# Patient Record
Sex: FEMALE | Race: WHITE | Employment: FULL TIME | URBAN - METROPOLITAN AREA
[De-identification: names, ages, dates, MRNs, and addresses within clinical notes are randomized per-mention and may not be internally consistent; named-entity substitution may affect disease eponyms.]

---

## 2017-04-10 ENCOUNTER — OFFICE VISIT (OUTPATIENT)
Dept: URGENT CARE | Facility: RETAIL CLINIC | Age: 57
End: 2017-04-10
Payer: COMMERCIAL

## 2017-04-10 VITALS
DIASTOLIC BLOOD PRESSURE: 83 MMHG | TEMPERATURE: 100.4 F | SYSTOLIC BLOOD PRESSURE: 143 MMHG | OXYGEN SATURATION: 97 % | HEART RATE: 88 BPM

## 2017-04-10 DIAGNOSIS — R03.0 ELEVATED BLOOD PRESSURE READING WITHOUT DIAGNOSIS OF HYPERTENSION: ICD-10-CM

## 2017-04-10 DIAGNOSIS — J02.9 ACUTE PHARYNGITIS, UNSPECIFIED ETIOLOGY: ICD-10-CM

## 2017-04-10 DIAGNOSIS — J02.0 ACUTE STREPTOCOCCAL PHARYNGITIS: Primary | ICD-10-CM

## 2017-04-10 LAB — S PYO AG THROAT QL IA.RAPID: ABNORMAL

## 2017-04-10 PROCEDURE — 87880 STREP A ASSAY W/OPTIC: CPT | Mod: QW | Performed by: PHYSICIAN ASSISTANT

## 2017-04-10 PROCEDURE — 99213 OFFICE O/P EST LOW 20 MIN: CPT | Performed by: PHYSICIAN ASSISTANT

## 2017-04-10 RX ORDER — CEPHALEXIN 500 MG/1
500 CAPSULE ORAL 2 TIMES DAILY
Qty: 20 CAPSULE | Refills: 0 | Status: SHIPPED | OUTPATIENT
Start: 2017-04-10 | End: 2018-08-17

## 2017-04-10 NOTE — NURSING NOTE
"Chief Complaint   Patient presents with     Pharyngitis     x 2 days       Initial Pulse 88  Temp 100.4  F (38  C) (Tympanic)  SpO2 97% Estimated body mass index is 37.66 kg/(m^2) as calculated from the following:    Height as of 4/8/16: 5' 9.4\" (1.763 m).    Weight as of 6/13/16: 258 lb (117 kg).  Medication Reconciliation: complete   Paz Yung      "

## 2017-04-10 NOTE — MR AVS SNAPSHOT
After Visit Summary   4/10/2017    Melanie Galeana    MRN: [id number]           Patient Information     Date Of Birth          1960        Visit Information        Provider Department      4/10/2017 10:50 AM Nicol Alvarez PA-C [facility]        Today's Diagnoses     Acute pharyngitis, unspecified etiology    -  1    Acute streptococcal pharyngitis        Elevated blood pressure reading without diagnosis of hypertension          Care Instructions      Pharyngitis: Strep (Confirmed)     You have had a positive test for strep throat. Strep throat is a contagious illness. It is spread by coughing, kissing or by touching others after touching your mouth or nose. Symptoms include throat pain which is worse with swallowing, aching all over, headache and fever. It is treated with antibiotic medication. This should help you start to feel better within 1-2 days.  Home care    Rest at home. Drink plenty of fluids to avoid dehydration.    No work or school for the first 2 days of taking the antibiotics. After this time, you will not be contagious. You can then return to school or work if you are feeling better.     The antibiotic medication must be taken for the full 10 days, even if you feel better. This is very important to ensure the infection is treated. It is also important to prevent drug-resistent organisms from developing. If you were given an antibiotic shot, no more antibiotics are needed.    You may use acetaminophen (Tylenol) or ibuprofen (Motrin, Advil) to control pain or fever, unless another medicine was prescribed for this. (NOTE: If you have chronic liver or kidney disease or ever had a stomach ulcer or GI bleeding, talk with your doctor before using these medicines.)    Throat lozenges or sprays (such as Chloraseptic) help reduce pain. Gargling with warm salt water will also reduce throat pain. Dissolve 1/2 teaspoon of salt in 1 glass of warm water. This may be  useful just before meals.     Soft foods are okay. Avoid salty or spicy foods.  Follow-up care  Follow up with your healthcare provider or our staff if you are not improving over the next week.  When to seek medical advice  Call your healthcare provider right away if any of these occur:    Fever as directed by your doctor     New or worsening ear pain, sinus pain, or headache    Painful lumps in the back of neck    Stiff neck    Lymph nodes are getting larger or becoming soft in the middle    Inability to swallow liquids, excessive drooling, or inability to open mouth wide due to throat pain    Signs of dehydration (very dark urine or no urine, sunken eyes, dizziness)    Trouble breathing or noisy breathing    Muffled voice    New rash    4087-5407 [employer]. [address] [place] [address], [place] [address]. All rights reserved. This information is not intended as a substitute for professional medical care. Always follow your healthcare professional's instructions.    ................................    Your blood pressure is elevated at today's visit.  You should follow up with your primary provider regarding possible hypertension if your recheck are greater than 140/90.  Check your blood pressure several times in the next week or so. You can do this at local pharmacies, grocery stores or with the float nurse at the [facility]. Record your readings and take them with you to your appointment.  Goal BP <140/90  Do not take decongestants - they can raise your BP.  If you have chest pain, unusual headaches, vision changes or any sign or symptoms of stroke seek prompt medical attention.  /83  Pulse 88  Temp 100.4  F (38  C) (Tympanic)  SpO2 97%    ................................      Please FOLLOW UP at primary care clinic if not improving, new symptoms, worse or this does not resolve.  [facility]  [phone]          Follow-ups after your visit        Who to contact     You can  reach your care team any time of the day by calling [phone].  Notification of test results:  If you have an abnormal lab result, we will notify you by phone as soon as possible.         Additional Information About Your Visit        [employer] Information     [employer] gives you secure access to your electronic health record. If you see a primary care provider, you can also send messages to your care team and make appointments. If you have questions, please call your primary care clinic.  If you do not have a primary care provider, please call [phone] and they will assist you.        Care EveryWhere ID     This is your Care EveryWhere ID. This could be used by other organizations to access your [facility] medical records  [id number]        Your Vitals Were     Pulse Temperature Pulse Oximetry             88 100.4  F (38  C) (Tympanic) 97%          Blood Pressure from Last 3 Encounters:   04/10/17 143/83   07/08/16 (!) 150/108   06/13/16 128/80    Weight from Last 3 Encounters:   06/13/16 258 lb (117 kg)   04/08/16 250 lb (113.4 kg)   03/18/16 249 lb (112.9 kg)              We Performed the Following     RAPID STREP SCREEN          Today's Medication Changes          These changes are accurate as of: 4/10/17 11:20 AM.  If you have any questions, ask your nurse or doctor.               Start taking these medicines.        Dose/Directions    cephALEXin 500 MG capsule   Commonly known as:  KEFLEX   Used for:  Acute streptococcal pharyngitis   Started by:  Nicol Alvarez PA-C        Dose:  500 mg   Take 1 capsule (500 mg) by mouth 2 times daily   Quantity:  20 capsule   Refills:  0            Where to get your medicines      These medications were sent to [facility] [address] [place] [address] - [address]  [address], [facility] [address]     Phone:  [phone]     cephALEXin 500 MG capsule                Primary Care Provider Office Phone # Fax #    Nasreen Campo PA-C [phone]        [address] [place] [address]        Thank you!     Thank you for choosing [facility]  for your care. Our goal is always to provide you with excellent care. Hearing back from our patients is one way we can continue to improve our services. Please take a few minutes to complete the written survey that you may receive in the mail after your visit with us. Thank you!             Your Updated Medication List - Protect others around you: Learn how to safely use, store and throw away your medicines at www.disposemymeds.org.          This list is accurate as of: 4/10/17 11:20 AM.  Always use your most recent med list.                   Brand Name Dispense Instructions for use    ASPIRIN PO      Take 650 mg by mouth daily as needed for moderate pain       cephALEXin 500 MG capsule    KEFLEX    20 capsule    Take 1 capsule (500 mg) by mouth 2 times daily       IBUPROFEN PO          multivitamin, therapeutic with minerals Tabs tablet      Take 1 tablet by mouth daily       PRO-BIOTIC BLEND Caps      Take by mouth daily       TYLENOL PO          VITAMIN D3 PO      Take by mouth daily

## 2017-04-10 NOTE — PROGRESS NOTES
Chief Complaint   Patient presents with     Pharyngitis     x 2 days         SUBJECTIVE:   Pt. presenting to [facility] -  with a chief complaint of ST an fever -started yest. Some nasal congestion.  Onset of symptoms sudden  Course of illness is same.    Severity moderate  Current and Associated symptoms: fever and sore throat  Treatment measures tried include Fluids, OTC meds and Rest.  Predisposing factors include works as a  -worked yest.  Last antibiotic > year    Past Medical History:   Diagnosis Date     Menopause 2013    age 52     Unspecified essential hypertension 8/2005     Past Surgical History:   Procedure Laterality Date     ARTHROSCOPY KNEE WITH MEDIAL MENISCECTOMY Right 3/9/2016    Procedure: ARTHROSCOPY KNEE WITH MEDIAL MENISCECTOMY;  Surgeon: Todd Larson MD;  Location: [facility] OR     COLONOSCOPY  09/08/08     COLONOSCOPY N/A 7/8/2016    Procedure: COLONOSCOPY;  Surgeon: Dewayne Moser MD;  Location: [facility] GI     HC COLONOSCOPY W BIOPSY  07/11/05     Patient Active Problem List   Diagnosis     Lipoma of other skin and subcutaneous tissue     Tear of medial meniscus of knee, right, initial encounter     CARDIOVASCULAR SCREENING; LDL GOAL LESS THAN 160     Family history of colon cancer     History of colonic polyps     Morbid obesity due to excess calories (H)     Menopause     Current Outpatient Prescriptions   Medication     Acetaminophen (TYLENOL PO)     IBUPROFEN PO     Probiotic Product (PRO-BIOTIC BLEND) CAPS     ASPIRIN PO     multivitamin, therapeutic with minerals (THERA-VIT-M) TABS     Cholecalciferol (VITAMIN D3 PO)     No current facility-administered medications for this visit.        OBJECTIVE:  /83  Pulse 88  Temp 100.4  F (38  C) (Tympanic)  SpO2 97%    GENERAL APPEARANCE: cooperative, alert and no distress. Appears well hydrated.  EYES: conjunctiva clear  HENT: Rt ear canal  clear and TM normal   Lt ear canal clear and TM normal   Nose some  "congestion. no discharge  Mouth without ulcers or lesions. marked erythema. no exudate.  NECK: supple, small to mod size soft shoddy NT ant nodes. No  posterior nodes.  RESP: lungs clear to auscultation - no rales, rhonchi or wheezes. Breathing easily.  CV: regular rates and rhythm  ABDOMEN:  soft, nontender, no HSM or masses and bowel sounds normal   SKIN: no suspicious lesions or rashes  no tenderness to palpate over  sinus areas.    Rapid strep pos    ASSESSMENT:     Acute pharyngitis, unspecified etiology  Acute streptococcal pharyngitis  Elevated blood pressure reading without diagnosis of hypertension      PLAN:  Symptomatic measures   Prescriptions as below. Discussed indications, dosing, side affects and adverse reactions of medications with  Patient -Ceph - (discussed cross sens with Live - she tolerated IV cefazolin 3/2016 so will use Ceph. States \"Zpack never works for me\" -discussed )  Eat yogurt daily or take a probiotic supplement when on antibiotics.  OTC cough suppressant/expectorant   Salt water gargles  - throat lozenges or honey/lemon tea if soothing   saline nasal spray for  nasal congestion   Cool mist vaporizer.   Stay in clean air environment.  > rest.  > fluids.  Contagiousness and hygiene discussed.  Fever and pain  control measures discussed.   If unable to swallow or any breathing difficulty to go to ED   Declined note for work -does not work until 4/12/2017.  Discussed BP  AVS given and discussed:  Patient Instructions     Pharyngitis: Strep (Confirmed)     You have had a positive test for strep throat. Strep throat is a contagious illness. It is spread by coughing, kissing or by touching others after touching your mouth or nose. Symptoms include throat pain which is worse with swallowing, aching all over, headache and fever. It is treated with antibiotic medication. This should help you start to feel better within 1-2 days.  Home care    Rest at home. Drink plenty of fluids to avoid " dehydration.    No work or school for the first 2 days of taking the antibiotics. After this time, you will not be contagious. You can then return to school or work if you are feeling better.     The antibiotic medication must be taken for the full 10 days, even if you feel better. This is very important to ensure the infection is treated. It is also important to prevent drug-resistent organisms from developing. If you were given an antibiotic shot, no more antibiotics are needed.    You may use acetaminophen (Tylenol) or ibuprofen (Motrin, Advil) to control pain or fever, unless another medicine was prescribed for this. (NOTE: If you have chronic liver or kidney disease or ever had a stomach ulcer or GI bleeding, talk with your doctor before using these medicines.)    Throat lozenges or sprays (such as Chloraseptic) help reduce pain. Gargling with warm salt water will also reduce throat pain. Dissolve 1/2 teaspoon of salt in 1 glass of warm water. This may be useful just before meals.     Soft foods are okay. Avoid salty or spicy foods.  Follow-up care  Follow up with your healthcare provider or our staff if you are not improving over the next week.  When to seek medical advice  Call your healthcare provider right away if any of these occur:    Fever as directed by your doctor     New or worsening ear pain, sinus pain, or headache    Painful lumps in the back of neck    Stiff neck    Lymph nodes are getting larger or becoming soft in the middle    Inability to swallow liquids, excessive drooling, or inability to open mouth wide due to throat pain    Signs of dehydration (very dark urine or no urine, sunken eyes, dizziness)    Trouble breathing or noisy breathing    Muffled voice    New rash    5265-0665 [employer]. [address] [place] [address]. All rights reserved. This information is not intended as a substitute for professional medical care. Always follow your healthcare professional's  instructions.    ................................    Your blood pressure is elevated at today's visit.  You should follow up with your primary provider regarding possible hypertension if your recheck are greater than 140/90.  Check your blood pressure several times in the next week or so. You can do this at local pharmacies, grocery stores or with the float nurse at the [facility]. Record your readings and take them with you to your appointment.  Goal BP <140/90  Do not take decongestants - they can raise your BP.  If you have chest pain, unusual headaches, vision changes or any sign or symptoms of stroke seek prompt medical attention.  /83  Pulse 88  Temp 100.4  F (38  C) (Tympanic)  SpO2 97%    ................................      Please FOLLOW UP at primary care clinic if not improving, new symptoms, worse or this does not resolve.  [facility]  [phone]      Pt is comfortable with this plan.  Electronically signed,  SHAI Alvarez, PAC

## 2017-08-23 ENCOUNTER — OFFICE VISIT (OUTPATIENT)
Dept: URGENT CARE | Facility: RETAIL CLINIC | Age: 57
End: 2017-08-23
Payer: COMMERCIAL

## 2017-08-23 VITALS
HEART RATE: 83 BPM | TEMPERATURE: 98.8 F | SYSTOLIC BLOOD PRESSURE: 176 MMHG | OXYGEN SATURATION: 98 % | DIASTOLIC BLOOD PRESSURE: 94 MMHG

## 2017-08-23 DIAGNOSIS — R05.9 COUGH: ICD-10-CM

## 2017-08-23 DIAGNOSIS — J20.9 ACUTE BRONCHITIS WITH COEXISTING CONDITION REQUIRING PROPHYLACTIC TREATMENT: Primary | ICD-10-CM

## 2017-08-23 DIAGNOSIS — R50.9 FEVER, UNSPECIFIED: ICD-10-CM

## 2017-08-23 DIAGNOSIS — R03.0 ELEVATED BLOOD PRESSURE READING WITHOUT DIAGNOSIS OF HYPERTENSION: ICD-10-CM

## 2017-08-23 PROCEDURE — 99213 OFFICE O/P EST LOW 20 MIN: CPT | Performed by: PHYSICIAN ASSISTANT

## 2017-08-23 RX ORDER — DOXYCYCLINE HYCLATE 100 MG
100 TABLET ORAL 2 TIMES DAILY
Qty: 20 TABLET | Refills: 0 | Status: SHIPPED | OUTPATIENT
Start: 2017-08-23 | End: 2017-09-02

## 2017-08-23 RX ORDER — ALBUTEROL SULFATE 90 UG/1
2 AEROSOL, METERED RESPIRATORY (INHALATION) 4 TIMES DAILY
Qty: 1 INHALER | Refills: 0 | Status: SHIPPED | OUTPATIENT
Start: 2017-08-23

## 2017-08-23 NOTE — PROGRESS NOTES
Chief Complaint   Patient presents with     Cough     cough since saturday, productive cough since monday along with fever, started wheezing last night         SUBJECTIVE:   Pt. presenting to [facility] -  with a chief complaint of cough x 5 days -getting worse, productive now. Low grade fever. Tired.. No SOB or chest pain   Hx of asthma no.  Onset of symptoms gradual  Course of illness is worsening.    Severity moderate  Current and Associated symptoms: fever and cough   Treatment measures tried include Fluids, OTC meds and Rest.  Predisposing factors include None.  Last antibiotic 4/2017 [facility] for strep     Pregnancy no  Smoker no    Past Medical History:   Diagnosis Date     Menopause 2013    age 52     Unspecified essential hypertension 8/2005     Past Surgical History:   Procedure Laterality Date     ARTHROSCOPY KNEE WITH MEDIAL MENISCECTOMY Right 3/9/2016    Procedure: ARTHROSCOPY KNEE WITH MEDIAL MENISCECTOMY;  Surgeon: Todd Larson MD;  Location: [facility] OR     COLONOSCOPY  09/08/08     COLONOSCOPY N/A 7/8/2016    Procedure: COLONOSCOPY;  Surgeon: Dewayne Moser MD;  Location:  GI     HC COLONOSCOPY W BIOPSY  07/11/05     Patient Active Problem List   Diagnosis     Lipoma of other skin and subcutaneous tissue     Tear of medial meniscus of knee, right, initial encounter     CARDIOVASCULAR SCREENING; LDL GOAL LESS THAN 160     Family history of colon cancer     History of colonic polyps     Morbid obesity due to excess calories (H)     Menopause     Current Outpatient Prescriptions   Medication     Acetaminophen (TYLENOL PO)     IBUPROFEN PO     Probiotic Product (PRO-BIOTIC BLEND) CAPS     ASPIRIN PO     multivitamin, therapeutic with minerals (THERA-VIT-M) TABS     Cholecalciferol (VITAMIN D3 PO)     cephALEXin (KEFLEX) 500 MG capsule     No current facility-administered medications for this visit.        ROS:  ENT - denies ear pain, throat pain. No nasal congestion  CP - see  above  GI- - appetite good. No nausea, vomiting or diarrhea.   No bowel or bladder changes   MSK - no joint pain or swelling   Skin: No rashes    OBJECTIVE:  BP (!) 176/94 (BP Location: Right arm, Patient Position: Chair, Cuff Size: Adult Regular)  Pulse 83  Temp 98.8  F (37.1  C) (Tympanic)  SpO2 98%    GENERAL APPEARANCE: cooperative, alert and no distress. Appears well hydrated.  EYES: conjunctiva clear  HENT: Rt ear canal  clear and TM normal   Lt ear canal clear and TM normal   Nose no congestion. no discharge  Mouth without ulcers or lesions. no erythema. no exudate.  NECK: supple, few small shoddy NT ant nodes. No  posterior nodes.  RESP: lungs no rales but sonorous ronchi left. Deep loose cough.Breathing easily.  CV: regular rates and rhythm  ABDOMEN:  soft, nontender, no HSM or masses and bowel sounds normal   SKIN: no suspicious lesions or rashes  no tenderness to palpate over  sinus areas.      ASSESSMENT:     Cough  Elevated blood pressure reading without diagnosis of hypertension  Acute bronchitis with coexisting condition requiring prophylactic treatment  Fever, unspecified      PLAN:  Symptomatic measures   Prescriptions as below. Discussed indications, dosing, side affects and adverse reactions of medications with  Patient - Doxy and Albuterol  Eat yogurt daily or take a probiotic supplement when on antibiotics.  OTC cough suppressant/expectorant discussed  Cool mist vaporizer.  Discussed BP - needs rechecks   Stay in clean air environment.  > rest.  > fluids.  Contagiousness and hygiene discussed.  Fever and pain  control measures discussed.   If unable to swallow or any breathing difficulty to go to ED AVS given and discussed:  Patient Instructions   Your blood pressure is elevated at today's visit.  You should follow up with your primary provider regarding possible hypertension if your recheck are greater than 140/90.  Check your blood pressure several times in the next week or so. You can do  this at local pharmacies, grocery stores or with the float nurse at the [facility]. Record your readings and take them with you to your appointment.  Goal BP <140/90  Do not take decongestants - they can raise your BP.  If you have chest pain, unusual headaches, vision changes or any sign or symptoms of stroke seek prompt medical attention.    BP (!) 176/94 (BP Location: Right arm, Patient Position: Chair, Cuff Size: Adult Regular)  Pulse 83  Temp 98.8  F (37.1  C) (Tympanic)  SpO2 98%    .......................................      Please FOLLOW UP at primary care clinic if not improving, new symptoms, worse or this does not resolve.  [facility]  [phone]    (Declines note for work - off the next few days)  Pt is comfortable with this plan.  Electronically signed,  SHAI Alvarez, PAC

## 2017-08-23 NOTE — PATIENT INSTRUCTIONS
Your blood pressure is elevated at today's visit.  You should follow up with your primary provider regarding possible hypertension if your recheck are greater than 140/90.  Check your blood pressure several times in the next week or so. You can do this at local pharmacies, grocery stores or with the float nurse at the [facility]. Record your readings and take them with you to your appointment.  Goal BP <140/90  Do not take decongestants - they can raise your BP.  If you have chest pain, unusual headaches, vision changes or any sign or symptoms of stroke seek prompt medical attention.    BP (!) 176/94 (BP Location: Right arm, Patient Position: Chair, Cuff Size: Adult Regular)  Pulse 83  Temp 98.8  F (37.1  C) (Tympanic)  SpO2 98%    .......................................      Please FOLLOW UP at primary care clinic if not improving, new symptoms, worse or this does not resolve.  [facility]  [phone]

## 2017-08-23 NOTE — MR AVS SNAPSHOT
After Visit Summary   8/23/2017    Melanie Galeana    MRN: [id number]           Patient Information     Date Of Birth          1960        Visit Information        Provider Department      8/23/2017 3:40 PM Nicol Alvarez PA-C [facility]        Today's Diagnoses     Cough    -  1    Elevated blood pressure reading without diagnosis of hypertension        Acute bronchitis with coexisting condition requiring prophylactic treatment        Fever, unspecified          Care Instructions    Your blood pressure is elevated at today's visit.  You should follow up with your primary provider regarding possible hypertension if your recheck are greater than 140/90.  Check your blood pressure several times in the next week or so. You can do this at local pharmacies, grocery stores or with the float nurse at the [facility]. Record your readings and take them with you to your appointment.  Goal BP <140/90  Do not take decongestants - they can raise your BP.  If you have chest pain, unusual headaches, vision changes or any sign or symptoms of stroke seek prompt medical attention.    BP (!) 176/94 (BP Location: Right arm, Patient Position: Chair, Cuff Size: Adult Regular)  Pulse 83  Temp 98.8  F (37.1  C) (Tympanic)  SpO2 98%    .......................................      Please FOLLOW UP at primary care clinic if not improving, new symptoms, worse or this does not resolve.  [facility]  [phone]            Follow-ups after your visit        Who to contact     You can reach your care team any time of the day by calling [phone].  Notification of test results:  If you have an abnormal lab result, we will notify you by phone as soon as possible.         Additional Information About Your Visit        MyChart Information     [employer] gives you secure access to your electronic health record. If you see a primary care provider, you can also send messages to your care  team and make appointments. If you have questions, please call your primary care clinic.  If you do not have a primary care provider, please call [phone] and they will assist you.        Care EveryWhere ID     This is your Care EveryWhere ID. This could be used by other organizations to access your [facility] medical records  [id number]        Your Vitals Were     Pulse Temperature Pulse Oximetry             83 98.8  F (37.1  C) (Tympanic) 98%          Blood Pressure from Last 3 Encounters:   08/23/17 (!) 176/94   04/10/17 143/83   07/08/16 (!) 150/108    Weight from Last 3 Encounters:   06/13/16 258 lb (117 kg)   04/08/16 250 lb (113.4 kg)   03/18/16 249 lb (112.9 kg)              Today, you had the following     No orders found for display         Today's Medication Changes          These changes are accurate as of: 8/23/17  3:50 PM.  If you have any questions, ask your nurse or doctor.               Start taking these medicines.        Dose/Directions    albuterol 108 (90 BASE) MCG/ACT Inhaler   Commonly known as:  PROAIR HFA/PROVENTIL HFA/VENTOLIN HFA   Used for:  Cough   Started by:  Nicol Alvarez PA-C        Dose:  2 puff   Inhale 2 puffs into the lungs 4 times daily asneeded for dry cough and wheezing   Quantity:  1 Inhaler   Refills:  0       doxycycline 100 MG tablet   Commonly known as:  VIBRA-TABS   Used for:  Acute bronchitis with coexisting condition requiring prophylactic treatment   Started by:  Nicol Alvarez PA-C        Dose:  100 mg   Take 1 tablet (100 mg) by mouth 2 times daily for 10 days   Quantity:  20 tablet   Refills:  0            Where to get your medicines      These medications were sent to [facility] - [place], [place] - [address]  [address], [facility] [address]     Phone:  [phone]     albuterol 108 (90 BASE) MCG/ACT Inhaler    doxycycline 100 MG tablet                Primary Care Provider Office Phone # Fax #    Nasreen Campo PA-C [phone]  [phone]       [address] [place] [address]        Equal Access to Services     SKYLERMICHAEL ISABELA : Hadii emelyn santo karlnat Sozoe, waaxda luqadaha, qaybta kazayda domirondaelvia, eb coffey joannjames mercerdick marshallindatom ponce. So [facility] [phone].    ATENCIÓN: Si habla español, tiene a montes disposición servicios gratuitos de asistencia lingüística. Llame al [phone].    We comply with applicable federal civil rights laws and [place] laws. We do not discriminate on the basis of race, color, national origin, age, disability sex, sexual orientation or gender identity.            Thank you!     Thank you for choosing [facility]  for your care. Our goal is always to provide you with excellent care. Hearing back from our patients is one way we can continue to improve our services. Please take a few minutes to complete the written survey that you may receive in the mail after your visit with us. Thank you!             Your Updated Medication List - Protect others around you: Learn how to safely use, store and throw away your medicines at www.disposemymeds.org.          This list is accurate as of: 8/23/17  3:50 PM.  Always use your most recent med list.                   Brand Name Dispense Instructions for use Diagnosis    albuterol 108 (90 BASE) MCG/ACT Inhaler    PROAIR HFA/PROVENTIL HFA/VENTOLIN HFA    1 Inhaler    Inhale 2 puffs into the lungs 4 times daily asneeded for dry cough and wheezing    Cough       ASPIRIN PO      Take 650 mg by mouth daily as needed for moderate pain        cephALEXin 500 MG capsule    KEFLEX    20 capsule    Take 1 capsule (500 mg) by mouth 2 times daily    Acute streptococcal pharyngitis       doxycycline 100 MG tablet    VIBRA-TABS    20 tablet    Take 1 tablet (100 mg) by mouth 2 times daily for 10 days    Acute bronchitis with coexisting condition requiring prophylactic treatment       IBUPROFEN PO           multivitamin, therapeutic with minerals Tabs tablet       Take 1 tablet by mouth daily        PRO-BIOTIC BLEND Caps      Take by mouth daily        TYLENOL PO           VITAMIN D3 PO      Take by mouth daily

## 2017-08-23 NOTE — NURSING NOTE
"Chief Complaint   Patient presents with     Cough     cough since saturday, productive cough since monday along with fever, started wheezing last night       Initial BP (!) 176/94 (BP Location: Right arm, Patient Position: Chair, Cuff Size: Adult Regular)  Pulse 83  Temp 98.8  F (37.1  C) (Tympanic)  SpO2 98% Estimated body mass index is 37.66 kg/(m^2) as calculated from the following:    Height as of 4/8/16: 5' 9.4\" (1.763 m).    Weight as of 6/13/16: 258 lb (117 kg).  Medication Reconciliation: complete     Jessica Sundet      "

## 2017-10-07 ENCOUNTER — ALLIED HEALTH/NURSE VISIT (OUTPATIENT)
Dept: URGENT CARE | Facility: RETAIL CLINIC | Age: 57
End: 2017-10-07
Payer: COMMERCIAL

## 2017-10-07 DIAGNOSIS — Z23 NEED FOR PROPHYLACTIC VACCINATION AND INOCULATION AGAINST INFLUENZA: Primary | ICD-10-CM

## 2017-10-07 PROCEDURE — 90471 IMMUNIZATION ADMIN: CPT | Performed by: PHYSICIAN ASSISTANT

## 2017-10-07 PROCEDURE — 90686 IIV4 VACC NO PRSV 0.5 ML IM: CPT | Performed by: PHYSICIAN ASSISTANT

## 2017-10-07 PROCEDURE — 99207 ZZC NO CHARGE NURSE ONLY: CPT | Performed by: PHYSICIAN ASSISTANT

## 2017-10-07 NOTE — MR AVS SNAPSHOT
After Visit Summary   10/7/2017    Melanie Galeana    MRN: [id number]           Patient Information     Date Of Birth          1960        Visit Information        Provider Department      10/7/2017 9:30 AM Nicol Alvarez PA-C [facility]        Today's Diagnoses     Need for prophylactic vaccination and inoculation against influenza    -  1       Follow-ups after your visit        Who to contact     You can reach your care team any time of the day by calling [phone].  Notification of test results:  If you have an abnormal lab result, we will notify you by phone as soon as possible.         Additional Information About Your Visit        MyChart Information     [employer] gives you secure access to your electronic health record. If you see a primary care provider, you can also send messages to your care team and make appointments. If you have questions, please call your primary care clinic.  If you do not have a primary care provider, please call [phone] and they will assist you.        Care EveryWhere ID     This is your Care EveryWhere ID. This could be used by other organizations to access your [facility] medical records  [id number]         Blood Pressure from Last 3 Encounters:   08/23/17 (!) 176/94   04/10/17 143/83   07/08/16 (!) 150/108    Weight from Last 3 Encounters:   06/13/16 258 lb (117 kg)   04/08/16 250 lb (113.4 kg)   03/18/16 249 lb (112.9 kg)              We Performed the Following     FLU VAC, SPLIT VIRUS IM > 3 YO (QUADRIVALENT) [47080]     Vaccine Administration, Initial [98464]        Primary Care Provider Office Phone # Fax #    Nasreen Cmapo PA-C [phone] [address] [place] [address]        Equal Access to Services     [facility]: Hadii emelyn Yepez, waaxda luqadaha, qaybta kaalmada finesse, eb ponce. So [facility] [phone].    ATENCIÓN: Si mansoor gerardo  montes disposición servicios gratuitos de asistencia lingüística. Deborah crook [phone].    We comply with applicable federal civil rights laws and [place] laws. We do not discriminate on the basis of race, color, national origin, age, disability, sex, sexual orientation, or gender identity.            Thank you!     Thank you for choosing [facility]  for your care. Our goal is always to provide you with excellent care. Hearing back from our patients is one way we can continue to improve our services. Please take a few minutes to complete the written survey that you may receive in the mail after your visit with us. Thank you!             Your Updated Medication List - Protect others around you: Learn how to safely use, store and throw away your medicines at www.disposemymeds.org.          This list is accurate as of: 10/7/17  2:07 PM.  Always use your most recent med list.                   Brand Name Dispense Instructions for use Diagnosis    albuterol 108 (90 BASE) MCG/ACT Inhaler    PROAIR HFA/PROVENTIL HFA/VENTOLIN HFA    1 Inhaler    Inhale 2 puffs into the lungs 4 times daily asneeded for dry cough and wheezing    Cough       ASPIRIN PO      Take 650 mg by mouth daily as needed for moderate pain        cephALEXin 500 MG capsule    KEFLEX    20 capsule    Take 1 capsule (500 mg) by mouth 2 times daily    Acute streptococcal pharyngitis       IBUPROFEN PO           multivitamin, therapeutic with minerals Tabs tablet      Take 1 tablet by mouth daily        PRO-BIOTIC BLEND Caps      Take by mouth daily        TYLENOL PO           VITAMIN D3 PO      Take by mouth daily

## 2017-10-07 NOTE — PROGRESS NOTES
Injectable Influenza Immunization Documentation    1.  Is the person to be vaccinated sick today?   No    2. Does the person to be vaccinated have an allergy to a component   of the vaccine?   No    3. Has the person to be vaccinated ever had a serious reaction   to influenza vaccine in the past?   No    4. Has the person to be vaccinated ever had Guillain-Barré syndrome?   No    Form completed by   Shagufta Briscoe

## 2018-08-17 ENCOUNTER — OFFICE VISIT (OUTPATIENT)
Dept: FAMILY MEDICINE | Facility: CLINIC | Age: 58
End: 2018-08-17
Payer: COMMERCIAL

## 2018-08-17 ENCOUNTER — HOSPITAL ENCOUNTER (OUTPATIENT)
Dept: GENERAL RADIOLOGY | Facility: CLINIC | Age: 58
Discharge: HOME | End: 2018-08-17
Attending: FAMILY MEDICINE | Admitting: FAMILY MEDICINE
Payer: COMMERCIAL

## 2018-08-17 ENCOUNTER — HOSPITAL ENCOUNTER (OUTPATIENT)
Dept: GENERAL RADIOLOGY | Facility: CLINIC | Age: 58
End: 2018-08-17
Attending: FAMILY MEDICINE
Payer: COMMERCIAL

## 2018-08-17 VITALS
TEMPERATURE: 96.9 F | BODY MASS INDEX: 41.29 KG/M2 | RESPIRATION RATE: 16 BRPM | WEIGHT: 278.8 LBS | HEIGHT: 69 IN | OXYGEN SATURATION: 98 % | DIASTOLIC BLOOD PRESSURE: 92 MMHG | SYSTOLIC BLOOD PRESSURE: 160 MMHG | HEART RATE: 90 BPM

## 2018-08-17 DIAGNOSIS — R07.89 RIGHT-SIDED CHEST WALL PAIN: ICD-10-CM

## 2018-08-17 DIAGNOSIS — R07.89 RIGHT-SIDED CHEST WALL PAIN: Primary | ICD-10-CM

## 2018-08-17 PROCEDURE — 99213 OFFICE O/P EST LOW 20 MIN: CPT | Performed by: FAMILY MEDICINE

## 2018-08-17 PROCEDURE — 71101 X-RAY EXAM UNILAT RIBS/CHEST: CPT | Mod: TC

## 2018-08-17 PROCEDURE — 71045 X-RAY EXAM CHEST 1 VIEW: CPT | Mod: TC

## 2018-08-17 RX ORDER — CYCLOBENZAPRINE HCL 10 MG
5-10 TABLET ORAL
Qty: 14 TABLET | Refills: 1 | Status: SHIPPED | OUTPATIENT
Start: 2018-08-17

## 2018-08-17 ASSESSMENT — PAIN SCALES - GENERAL: PAINLEVEL: MILD PAIN (2)

## 2018-08-17 NOTE — PROGRESS NOTES
SUBJECTIVE:   Melanie Galeana is a 57 year old female who presents to clinic today for the following health issues:    Joint Pain    Onset: 10 days    Description:   Location: right rib  Character: Sharp and Stabbing    Intensity: moderate, severe    Progression of Symptoms: intermittent    Accompanying Signs & Symptoms:  Other symptoms: none    History:   Previous similar pain: no       Precipitating factors:   Trauma or overuse: YES- leaned over her rocking chair arm and popped or snapped     Alleviating factors:  Improved by: nothing    Therapies Tried and outcome: ibuprofen, tylenol    Melanie is here today for 10 day history is of rib/chest pain.  Happened as she was trying to  it dropped object, overextended her side and pressed her right chest against the arm of the glider.   Felt the pain right away and the pain persists and overall about the same since then.  Localized to the right chest just below the breast.  Sharp and stabbing pain.  No pain when she sits still.  Worse with coughing, hiccupping, getting out of bed or taking deep breaths.  Worse with exertion as well.  Never had this before.  No shortness of breath or dyspnea.  No orthopnea.  No nausea, vomiting, diarrhea constipation.  No fever or chills.  No other concern.    Problem list and histories reviewed & adjusted, as indicated.  Additional history: as documented    Current Outpatient Prescriptions   Medication Sig Dispense Refill     Acetaminophen (TYLENOL PO)        albuterol (PROAIR HFA/PROVENTIL HFA/VENTOLIN HFA) 108 (90 BASE) MCG/ACT Inhaler Inhale 2 puffs into the lungs 4 times daily asneeded for dry cough and wheezing 1 Inhaler 0     ALFALFA PO Take 10 tablets by mouth daily       ASPIRIN PO Take 650 mg by mouth daily as needed for moderate pain       Cholecalciferol (VITAMIN D3 PO) Take by mouth daily       cyclobenzaprine (FLEXERIL) 10 MG tablet Take 0.5-1 tablets (5-10 mg) by mouth nightly as needed for muscle spasms 14 tablet 1  "    IBUPROFEN PO        multivitamin, therapeutic with minerals (THERA-VIT-M) TABS Take 1 tablet by mouth daily       Probiotic Product (PRO-BIOTIC BLEND) CAPS Take by mouth daily       Allergies   Allergen Reactions     Penicillins Swelling     \"blew up like a balloon\"       Reviewed and updated as needed this visit by clinical staff  Tobacco  Allergies  Meds  Soc Hx      Reviewed and updated as needed this visit by Provider         ROS:  Constitutional, HEENT, cardiovascular, pulmonary, gi and gu systems are negative, except as otherwise noted.    OBJECTIVE:     BP (!) 160/92 (BP Location: Right arm, Patient Position: Sitting, Cuff Size: Adult Large)  Pulse 90  Temp 96.9  F (36.1  C) (Temporal)  Resp 16  Ht 5' 9\" (1.753 m)  Wt 278 lb 12.8 oz (126.5 kg)  LMP 07/22/2008  SpO2 98%  Breastfeeding? No  BMI 41.17 kg/m2  Body mass index is 41.17 kg/(m^2).  GENERAL: healthy, alert and no distress.  Speaking in full sentences  NECK: no adenopathy,  supple, no tender with palpation to the spine or its paraspinous muscle  RESP: lungs clear to auscultation - no rales, rhonchi or wheezes.  Good respiratory effort throughout.  Discomfort expressed with deep inspiration.  Pain was reproducible with palpation to the rib cage, just underneath the right breast.  CV: regular rate and rhythm, no murmur, no peripheral edema and peripheral pulses strong  ABDOMEN: soft, nontender, nondistended, no palpable masses or organomegaly with normal bowel sounds.  No tender with palpation to upper right quadrant.  No CVA tenderness    Diagnostic Test Results:  No results found for this or any previous visit (from the past 24 hour(s)).    ASSESSMENT/PLAN:       ICD-10-CM    1. Right-sided chest wall pain R07.89 XR Ribs & Chest Rt 3vw     cyclobenzaprine (FLEXERIL) 10 MG tablet     XR Chest 1 View     CANCELED: XR Chest 2 Views     Chest x-ray was normal, no fracture or concerned intra-pulmonary findings.  Most likely she has a " contusion of the right ribs.  Discussed with her about nature of the condition.  Tylenol or Motrin as needed for pain.  Normal activities as tolerated.  Emphasized importance of taking deep breaths frequently to prevent atelectasis or pneumonia/infection.  Flexeril at night as needed for muscle spasm.  Follow-up with her primary care provider if it persists or gets worse in the next several days.    Brown Morton Mai, MD  [facility]

## 2018-08-17 NOTE — NURSING NOTE
Chief Complaint   Patient presents with     Rib Injury     right side, x10 days, at home     Health Maintenance Due   Topic Date Due     HIV SCREEN (SYSTEM ASSIGNED)  11/28/1980     PHQ-2 Q1 YR  06/13/2017     ADVANCE DIRECTIVE PLANNING Q5 YRS  11/28/2017     MAMMO SCREEN Q2 YR (SYSTEM ASSIGNED)  06/13/2018     Health Maintenance reviewed at today's visit patient asked to schedule/complete:   Breast Cancer:  Patient declined     Frederic Robledo, CMA

## 2018-08-17 NOTE — MR AVS SNAPSHOT
After Visit Summary   8/17/2018    Melanie Galeana    MRN: [id number]           Patient Information     Date Of Birth          1960        Visit Information        Provider Department      8/17/2018 10:20 AM Brown Viveros MD [facility]        Today's Diagnoses     Right-sided chest wall pain    -  1       Follow-ups after your visit        Future tests that were ordered for you today     Open Future Orders        Priority Expected Expires Ordered    XR Ribs & Chest Rt 3vw Routine 8/17/2018 8/17/2019 8/17/2018    XR Chest 1 View Routine 8/17/2018 8/17/2019 8/17/2018            Who to contact     If you have questions or need follow up information about today's clinic visit or your schedule please contact [facility] directly at [phone].  Normal or non-critical lab and imaging results will be communicated to you by MyChart, letter or phone within 4 business days after the clinic has received the results. If you do not hear from us within 7 days, please contact the clinic through [employer] or phone. If you have a critical or abnormal lab result, we will notify you by phone as soon as possible.  Submit refill requests through [employer] or call your pharmacy and they will forward the refill request to us. Please allow 3 business days for your refill to be completed.          Additional Information About Your Visit        MyChart Information     [employer] gives you secure access to your electronic health record. If you see a primary care provider, you can also send messages to your care team and make appointments. If you have questions, please call your primary care clinic.  If you do not have a primary care provider, please call [phone] and they will assist you.        Care EveryWhere ID     This is your Care EveryWhere ID. This could be used by other organizations to access your [facility] medical records  [id number]        Your Vitals Were     Pulse Temperature  "Respirations Height Last Period Pulse Oximetry    90 96.9  F (36.1  C) (Temporal) 16 5' 9\" (1.753 m) 07/22/2008 98%    Breastfeeding? BMI (Body Mass Index)                No 41.17 kg/m2           Blood Pressure from Last 3 Encounters:   08/17/18 (!) 160/92   08/23/17 (!) 176/94   04/10/17 143/83    Weight from Last 3 Encounters:   08/17/18 278 lb 12.8 oz (126.5 kg)   06/13/16 258 lb (117 kg)   04/08/16 250 lb (113.4 kg)                 Today's Medication Changes          These changes are accurate as of 8/17/18 12:14 PM.  If you have any questions, ask your nurse or doctor.               Start taking these medicines.        Dose/Directions    cyclobenzaprine 10 MG tablet   Commonly known as:  FLEXERIL   Used for:  Right-sided chest wall pain   Started by:  Brown Viveros MD        Dose:  5-10 mg   Take 0.5-1 tablets (5-10 mg) by mouth nightly as needed for muscle spasms   Quantity:  14 tablet   Refills:  1            Where to get your medicines      These medications were sent to [address]  [address], [facility] [address]     Phone:  [phone]     cyclobenzaprine 10 MG tablet                Primary Care Provider Office Phone # Fax #    Nasreen ZEINAB Campo PA-C [phone]       [address] [place] [address]        Equal Access to Services     [facility]: Hadii emelyn ku hadasho Soomaali, waaxda luqadaha, qaybta kaalmada adeegyada, waxay erlinda nelson adedick isbell . So [facility] [phone].    ATENCIÓN: Si habla español, tiene a montes disposición servicios gratuitos de asistencia lingüística. Llame al [phone].    We comply with applicable federal civil rights laws and [place] laws. We do not discriminate on the basis of race, color, national origin, age, disability, sex, sexual orientation, or gender identity.            Thank you!     Thank you for choosing [facility]  for your care. Our goal is always to provide you with excellent " care. Hearing back from our patients is one way we can continue to improve our services. Please take a few minutes to complete the written survey that you may receive in the mail after your visit with us. Thank you!             Your Updated Medication List - Protect others around you: Learn how to safely use, store and throw away your medicines at www.disposemymeds.org.          This list is accurate as of 8/17/18 12:14 PM.  Always use your most recent med list.                   Brand Name Dispense Instructions for use Diagnosis    albuterol 108 (90 Base) MCG/ACT inhaler    PROAIR HFA/PROVENTIL HFA/VENTOLIN HFA    1 Inhaler    Inhale 2 puffs into the lungs 4 times daily asneeded for dry cough and wheezing    Cough       ALFALFA PO      Take 10 tablets by mouth daily        ASPIRIN PO      Take 650 mg by mouth daily as needed for moderate pain        cyclobenzaprine 10 MG tablet    FLEXERIL    14 tablet    Take 0.5-1 tablets (5-10 mg) by mouth nightly as needed for muscle spasms    Right-sided chest wall pain       IBUPROFEN PO           multivitamin, therapeutic with minerals Tabs tablet      Take 1 tablet by mouth daily        PRO-BIOTIC BLEND Caps      Take by mouth daily        TYLENOL PO           VITAMIN D3 PO      Take by mouth daily

## 2018-11-13 ENCOUNTER — TELEPHONE (OUTPATIENT)
Dept: FAMILY MEDICINE | Facility: CLINIC | Age: 58
End: 2018-11-13

## 2018-11-13 NOTE — TELEPHONE ENCOUNTER
Panel Management Review      Patient has the following on her problem list: None      Composite cancer screening  Chart review shows that this patient is due/due soon for the following Mammogram  Summary:    Patient is due/failing the following:   MAMMOGRAM    Action needed:   Patient needs referral/order: mammogram    Type of outreach:    Phone, left message for patient to call back.     Questions for provider review:    None                                                                                                                                    Frederic Robledo CMA       Chart routed to Care Team .

## 2018-11-13 NOTE — TELEPHONE ENCOUNTER
Patient called back and stated that she doesn't want to get this done right now and will call to schedule it when she has time in her schedule.       Thank you,  Olga Nichols   for [facility]

## 2018-11-13 NOTE — TELEPHONE ENCOUNTER
Left message for patient call back. Patient is due for MAMMOGRAM. Please assist patient in scheduling. If patient declines or has had elsewhere please note and route back to care team.        Frederic Robledo, [facility]

## 2019-06-03 ASSESSMENT — ENCOUNTER SYMPTOMS
PARESTHESIAS: 0
SORE THROAT: 0
DIARRHEA: 0
NERVOUS/ANXIOUS: 0
ARTHRALGIAS: 1
HEADACHES: 0
BREAST MASS: 0
COUGH: 0
HEARTBURN: 0
JOINT SWELLING: 1
CONSTIPATION: 0
HEMATURIA: 0
EYE PAIN: 1
CHILLS: 0
WEAKNESS: 0
SHORTNESS OF BREATH: 0
MYALGIAS: 0
NAUSEA: 0
PALPITATIONS: 0
FEVER: 0
DYSURIA: 0
ABDOMINAL PAIN: 0
HEMATOCHEZIA: 0
DIZZINESS: 0
FREQUENCY: 0

## 2019-06-06 ENCOUNTER — TELEPHONE (OUTPATIENT)
Dept: FAMILY MEDICINE | Facility: CLINIC | Age: 59
End: 2019-06-06

## 2019-06-06 NOTE — TELEPHONE ENCOUNTER
Panel Management Review      Patient has the following on her problem list: None      Composite cancer screening  Chart review shows that this patient is due/due soon for the following Mammogram  Summary:    Patient is due/failing the following:   MAMMOGRAM and PHYSICAL    Action needed:   Patient needs office visit for physcial. and Patient needs referral/order: Mammo    Type of outreach:    Phone, left message for patient to call back.     Questions for provider review:    None                                                                                                                                    Melissa Dela Cruz MA 6/6/2019       Chart routed to Care Team .

## 2019-06-10 ENCOUNTER — HOSPITAL ENCOUNTER (OUTPATIENT)
Dept: MAMMOGRAPHY | Facility: CLINIC | Age: 59
Discharge: HOME | End: 2019-06-10
Attending: NURSE PRACTITIONER | Admitting: NURSE PRACTITIONER
Payer: COMMERCIAL

## 2019-06-10 ENCOUNTER — OFFICE VISIT (OUTPATIENT)
Dept: FAMILY MEDICINE | Facility: CLINIC | Age: 59
End: 2019-06-10
Payer: COMMERCIAL

## 2019-06-10 VITALS
OXYGEN SATURATION: 97 % | SYSTOLIC BLOOD PRESSURE: 132 MMHG | BODY MASS INDEX: 41.77 KG/M2 | WEIGHT: 282 LBS | HEIGHT: 69 IN | TEMPERATURE: 97.6 F | HEART RATE: 88 BPM | DIASTOLIC BLOOD PRESSURE: 100 MMHG

## 2019-06-10 DIAGNOSIS — Z12.31 VISIT FOR SCREENING MAMMOGRAM: ICD-10-CM

## 2019-06-10 DIAGNOSIS — Z00.00 VISIT FOR PREVENTIVE HEALTH EXAMINATION: ICD-10-CM

## 2019-06-10 DIAGNOSIS — Z12.31 ENCOUNTER FOR SCREENING MAMMOGRAM FOR BREAST CANCER: ICD-10-CM

## 2019-06-10 DIAGNOSIS — Z12.31 ENCOUNTER FOR SCREENING MAMMOGRAM FOR BREAST CANCER: Primary | ICD-10-CM

## 2019-06-10 DIAGNOSIS — Z23 NEED FOR VACCINATION: ICD-10-CM

## 2019-06-10 LAB
ALBUMIN SERPL-MCNC: 4 G/DL (ref 3.4–5)
ALP SERPL-CCNC: 95 U/L (ref 40–150)
ALT SERPL W P-5'-P-CCNC: 15 U/L (ref 0–50)
ANION GAP SERPL CALCULATED.3IONS-SCNC: 6 MMOL/L (ref 3–14)
AST SERPL W P-5'-P-CCNC: 13 U/L (ref 0–45)
BILIRUB SERPL-MCNC: 1.4 MG/DL (ref 0.2–1.3)
BUN SERPL-MCNC: 13 MG/DL (ref 7–30)
CALCIUM SERPL-MCNC: 8.8 MG/DL (ref 8.5–10.1)
CHLORIDE SERPL-SCNC: 108 MMOL/L (ref 94–109)
CO2 SERPL-SCNC: 27 MMOL/L (ref 20–32)
CREAT SERPL-MCNC: 0.8 MG/DL (ref 0.52–1.04)
GFR SERPL CREATININE-BSD FRML MDRD: 83 ML/MIN/{1.73_M2}
GLUCOSE SERPL-MCNC: 100 MG/DL (ref 70–99)
POTASSIUM SERPL-SCNC: 4.5 MMOL/L (ref 3.4–5.3)
PROT SERPL-MCNC: 7.2 G/DL (ref 6.8–8.8)
SODIUM SERPL-SCNC: 141 MMOL/L (ref 133–144)

## 2019-06-10 PROCEDURE — 36415 COLL VENOUS BLD VENIPUNCTURE: CPT | Performed by: NURSE PRACTITIONER

## 2019-06-10 PROCEDURE — 90750 HZV VACC RECOMBINANT IM: CPT | Performed by: NURSE PRACTITIONER

## 2019-06-10 PROCEDURE — 80053 COMPREHEN METABOLIC PANEL: CPT | Performed by: NURSE PRACTITIONER

## 2019-06-10 PROCEDURE — 77063 BREAST TOMOSYNTHESIS BI: CPT

## 2019-06-10 PROCEDURE — 99396 PREV VISIT EST AGE 40-64: CPT | Mod: 25 | Performed by: NURSE PRACTITIONER

## 2019-06-10 PROCEDURE — 90471 IMMUNIZATION ADMIN: CPT | Performed by: NURSE PRACTITIONER

## 2019-06-10 ASSESSMENT — ENCOUNTER SYMPTOMS
EYE PAIN: 1
FEVER: 0
PARESTHESIAS: 0
MYALGIAS: 0
PALPITATIONS: 0
COUGH: 0
WEAKNESS: 0
CHILLS: 0
HEMATURIA: 0
ARTHRALGIAS: 1
HEADACHES: 0
DIARRHEA: 0
DIZZINESS: 0
HEMATOCHEZIA: 0
SORE THROAT: 0
ABDOMINAL PAIN: 0
DYSURIA: 0
BREAST MASS: 0
NAUSEA: 0
SHORTNESS OF BREATH: 0
CONSTIPATION: 0
JOINT SWELLING: 1
NERVOUS/ANXIOUS: 0
FREQUENCY: 0
HEARTBURN: 0

## 2019-06-10 ASSESSMENT — MIFFLIN-ST. JEOR: SCORE: 1933.52

## 2019-06-10 NOTE — PATIENT INSTRUCTIONS
Zoster/kShingles Vaccine today, we will need to booster this vaccine 1 time, we will let you know when this has to happen.     Mammogram today.    HIV screen today, someone will call with those results.     Plan for yearly follow up.    Call with any new or concerning symptoms prior to your next appointment.    Due to chronic tylenol and ibuprofen use I will check you liver and kidney function. Remember to take the ibuprofen with food, and only take the recommended daily dose of the Tylenol.     Stevie Rodriguez CNP    Preventive Health Recommendations  Female Ages 50 - 64    Yearly exam: See your health care provider every year in order to  o Review health changes.   o Discuss preventive care.    o Review your medicines if your doctor has prescribed any.      Get a Pap test every three years (unless you have an abnormal result and your provider advises testing more often).    If you get Pap tests with HPV test, you only need to test every 5 years, unless you have an abnormal result.     You do not need a Pap test if your uterus was removed (hysterectomy) and you have not had cancer.    You should be tested each year for STDs (sexually transmitted diseases) if you're at risk.     Have a mammogram every 1 to 2 years.    Have a colonoscopy at age 50, or have a yearly FIT test (stool test). These exams screen for colon cancer.      Have a cholesterol test every 5 years, or more often if advised.    Have a diabetes test (fasting glucose) every three years. If you are at risk for diabetes, you should have this test more often.     If you are at risk for osteoporosis (brittle bone disease), think about having a bone density scan (DEXA).    Shots: Get a flu shot each year. Get a tetanus shot every 10 years.    Nutrition:     Eat at least 5 servings of fruits and vegetables each day.    Eat whole-grain bread, whole-wheat pasta and brown rice instead of white grains and rice.    Get adequate Calcium and Vitamin D.      Lifestyle    Exercise at least 150 minutes a week (30 minutes a day, 5 days a week). This will help you control your weight and prevent disease.    Limit alcohol to one drink per day.    No smoking.     Wear sunscreen to prevent skin cancer.     See your dentist every six months for an exam and cleaning.    See your eye doctor every 1 to 2 years.

## 2019-06-10 NOTE — PROGRESS NOTES
SUBJECTIVE:   CC: Melanie Galeana is an 58 year old woman who presents for preventive health visit.     Melanie presents alone today for her physical. She is working full time as a . She has some knee and foot pain secondary to arthritis. Takes tylenol and ibuprofen every morning to help get her started for the day for work, does not use other wise.    Dentist and eye doctor regularly. She has gained some weight over the last few years, wants to start working at weight loss now. She also would like the Zoster vaccine today.     Healthy Habits:     Getting at least 3 servings of Calcium per day:  Yes    Bi-annual eye exam:  Yes    Dental care twice a year:  Yes    Sleep apnea or symptoms of sleep apnea:  Daytime drowsiness    Diet:  Low salt    Frequency of exercise:  None    Taking medications regularly:  Yes    Barriers to taking medications:  Not applicable    Medication side effects:  None    PHQ-2 Total Score: 0    Additional concerns today:  Yes              Today's PHQ-2 Score:   PHQ-2 (  [employer]) 6/3/2019   Q1: Little interest or pleasure in doing things 0   Q2: Feeling down, depressed or hopeless 0   PHQ-2 Score 0   Q1: Little interest or pleasure in doing things Not at all   Q2: Feeling down, depressed or hopeless Not at all   PHQ-2 Score 0       Abuse: Current or Past(Physical, Sexual or Emotional)- No  Do you feel safe in your environment? Yes    Social History     Tobacco Use     Smoking status: Former Smoker     Last attempt to quit: 1990     Years since quittin.4     Smokeless tobacco: Never Used   Substance Use Topics     Alcohol use: Yes     Comment: rare         Alcohol Use 6/3/2019   Prescreen: >3 drinks/day or >7 drinks/week? No   Prescreen: >3 drinks/day or >7 drinks/week? -       Reviewed orders with patient.  Reviewed health maintenance and updated orders accordingly - Yes  Lab work is in process  Labs reviewed in [facility]    Mammogram Screening: Patient over age 50, mutual  decision to screen reflected in health maintenance.    Pertinent mammograms are reviewed under the imaging tab.  History of abnormal Pap smear: NO - age 30-65 PAP every 5 years with negative HPV co-testing recommended  PAP / HPV Latest Ref Rng & Units 6/13/2016 8/12/2008 8/9/2007   PAP - NIL NIL NIL   HPV 16 DNA NEG Negative - -   HPV 18 DNA NEG Negative - -   OTHER HR HPV NEG Negative - -     Reviewed and updated as needed this visit by clinical staff  Tobacco  Allergies  Meds  Med Hx  Surg Hx  Fam Hx  Soc Hx        Reviewed and updated as needed this visit by Provider          Past Medical History:   Diagnosis Date     Menopause 2013    age 52     Unspecified essential hypertension 8/2005      Past Surgical History:   Procedure Laterality Date     ARTHROSCOPY KNEE WITH MEDIAL MENISCECTOMY Right 3/9/2016    Procedure: ARTHROSCOPY KNEE WITH MEDIAL MENISCECTOMY;  Surgeon: Todd Larson MD;  Location: [facility]     COLONOSCOPY  09/08/08     COLONOSCOPY N/A 7/8/2016    Procedure: COLONOSCOPY;  Surgeon: Dewayne Moser MD;  Location:  GI     HC COLONOSCOPY W BIOPSY  07/11/05       Review of Systems   Constitutional: Negative for chills and fever.   HENT: Negative for congestion, ear pain, hearing loss and sore throat.    Eyes: Positive for pain and visual disturbance.   Respiratory: Negative for cough and shortness of breath.    Cardiovascular: Negative for chest pain, palpitations and peripheral edema.   Gastrointestinal: Negative for abdominal pain, constipation, diarrhea, heartburn, hematochezia and nausea.   Breasts:  Negative for tenderness, breast mass and discharge.   Genitourinary: Negative for dysuria, frequency, genital sores, hematuria, pelvic pain, urgency, vaginal bleeding and vaginal discharge.   Musculoskeletal: Positive for arthralgias and joint swelling. Negative for myalgias.   Skin: Negative for rash.   Neurological: Negative for dizziness, weakness, headaches and paresthesias.  "  Psychiatric/Behavioral: Negative for mood changes. The patient is not nervous/anxious.      CONSTITUTIONAL: NEGATIVE for fever, chills, change in weight  INTEGUMENTARY/SKIN: NEGATIVE for worrisome rashes, moles or lesions  EYES: NEGATIVE for vision changes or irritation  ENT: NEGATIVE for ear, mouth and throat problems  RESP: NEGATIVE for significant cough or SOB  BREAST: NEGATIVE for masses, tenderness or discharge  CV: NEGATIVE for chest pain, palpitations or peripheral edema  GI: NEGATIVE for nausea, abdominal pain, heartburn, or change in bowel habits  : NEGATIVE for unusual urinary or vaginal symptoms. No vaginal bleeding.  MUSCULOSKELETAL: NEGATIVE for significant arthralgias or myalgia  NEURO: NEGATIVE for weakness, dizziness or paresthesias  PSYCHIATRIC: NEGATIVE for changes in mood or affect      OBJECTIVE:   BP (!) 132/100 (Cuff Size: Adult Large)   Pulse 88   Temp 97.6  F (36.4  C) (Temporal)   Ht 1.753 m (5' 9\")   Wt 127.9 kg (282 lb)   LMP 07/22/2008   SpO2 97%   BMI 41.64 kg/m    Physical Exam  GENERAL APPEARANCE: healthy, alert and no distress  EYES: Eyes grossly normal to inspection, PERRL and conjunctivae and sclerae normal  HENT: ear canals and TM's normal, nose and mouth without ulcers or lesions, oropharynx clear and oral mucous membranes moist  NECK: no adenopathy, no asymmetry, masses, or scars and thyroid normal to palpation  RESP: lungs clear to auscultation - no rales, rhonchi or wheezes  BREAST: normal without masses, tenderness or nipple discharge and no palpable axillary masses or adenopathy  CV: regular rate and rhythm, normal S1 S2, no S3 or S4, no murmur, click or rub, no peripheral edema and peripheral pulses strong  ABDOMEN: soft, nontender, no hepatosplenomegaly, no masses and bowel sounds normal  MS: no musculoskeletal defects are noted and gait is age appropriate without ataxia  SKIN: no suspicious lesions or rashes  NEURO: Normal strength and tone, sensory exam grossly " "normal, mentation intact and speech normal  PSYCH: mentation appears normal and affect normal/bright      ASSESSMENT/PLAN:   1. Encounter for screening mammogram for breast cancer  Will get this today.  - *MA Screening Digital Bilateral; Future  - *MA Screening Digital Bilateral; Future    2. Visit for preventive health examination  - She is doing well.   - Will call for new or concerning symptoms prior to her next follow up appointment.   - **HIV Antigen Antibody Combo FUTURE anytime; Future  - Comprehensive metabolic panel    3. Need for vaccination  - Has had chicken pox and would like to avoid a shingles outbreak.   - SHINGRIX [[id number]]  - 1st  Administration  [[id number]]    COUNSELING:  Reviewed preventive health counseling, as reflected in patient instructions       Regular exercise       Healthy diet/nutrition       Vision screening       Colon cancer screening       Advance Care Planning    Estimated body mass index is 41.64 kg/m  as calculated from the following:    Height as of this encounter: 1.753 m (5' 9\").    Weight as of this encounter: 127.9 kg (282 lb).    Weight management plan: Discussed healthy diet and exercise guidelines     reports that she quit smoking about 29 years ago. She has never used smokeless tobacco.      Counseling Resources:  ATP IV Guidelines  Pooled Cohorts Equation Calculator  Breast Cancer Risk Calculator  FRAX Risk Assessment  ICSI Preventive Guidelines  Dietary Guidelines for Americans, 2010  USDA's MyPlate  ASA Prophylaxis  Lung CA Screening    Stevie Rodriguez NP  [facility]  "

## 2019-06-13 ENCOUNTER — TELEPHONE (OUTPATIENT)
Dept: FAMILY MEDICINE | Facility: CLINIC | Age: 59
End: 2019-06-13

## 2019-06-13 DIAGNOSIS — R73.09 ELEVATED GLUCOSE: ICD-10-CM

## 2019-06-13 DIAGNOSIS — R94.5 NONSPECIFIC ABNORMAL RESULTS OF LIVER FUNCTION STUDY: Primary | ICD-10-CM

## 2019-06-13 NOTE — TELEPHONE ENCOUNTER
Patient was informed that her glucose and one of her liver function tests are up just a bit, nothing Stevie Rodriguez NP is worried about. CHRISTIANO Hubbard would like you to push fluids, watch your carb intake and recheck CMP fasting in about 3 months. CMP order was placed for future..    Frederic Robledo, [facility] [address]

## 2019-06-13 NOTE — TELEPHONE ENCOUNTER
----- Message from Stevie Rodriguez NP sent at 6/13/2019 11:47 AM CDT -----  Please call with results. Please call patient and let her know that her glucose and one of her liver function tests are up just a bit, nothing I am worried about. Please have her push fluids, watch carb intake and recheck CMP fasting in about 3 months.     Thank you,      Stevie Rodriguez NP on 6/13/2019 at 11:46 AM

## 2019-09-17 ENCOUNTER — ALLIED HEALTH/NURSE VISIT (OUTPATIENT)
Dept: URGENT CARE | Facility: RETAIL CLINIC | Age: 59
End: 2019-09-17
Payer: COMMERCIAL

## 2019-09-17 DIAGNOSIS — Z23 NEED FOR PROPHYLACTIC VACCINATION AND INOCULATION AGAINST INFLUENZA: Primary | ICD-10-CM

## 2019-09-17 PROCEDURE — 90682 RIV4 VACC RECOMBINANT DNA IM: CPT | Performed by: INTERNAL MEDICINE

## 2019-09-17 PROCEDURE — 90471 IMMUNIZATION ADMIN: CPT | Performed by: INTERNAL MEDICINE

## 2019-09-17 PROCEDURE — 99207 ZZC NO CHARGE NURSE ONLY: CPT | Performed by: INTERNAL MEDICINE

## 2019-09-24 DIAGNOSIS — Z00.00 VISIT FOR PREVENTIVE HEALTH EXAMINATION: ICD-10-CM

## 2019-09-24 DIAGNOSIS — R73.09 ELEVATED GLUCOSE: ICD-10-CM

## 2019-09-24 DIAGNOSIS — R94.5 NONSPECIFIC ABNORMAL RESULTS OF LIVER FUNCTION STUDY: ICD-10-CM

## 2019-09-24 LAB
ALBUMIN SERPL-MCNC: 3.5 G/DL (ref 3.4–5)
ALP SERPL-CCNC: 79 U/L (ref 40–150)
ALT SERPL W P-5'-P-CCNC: 12 U/L (ref 0–50)
ANION GAP SERPL CALCULATED.3IONS-SCNC: 6 MMOL/L (ref 3–14)
AST SERPL W P-5'-P-CCNC: 11 U/L (ref 0–45)
BILIRUB SERPL-MCNC: 1.2 MG/DL (ref 0.2–1.3)
BUN SERPL-MCNC: 13 MG/DL (ref 7–30)
CALCIUM SERPL-MCNC: 8.4 MG/DL (ref 8.5–10.1)
CHLORIDE SERPL-SCNC: 111 MMOL/L (ref 94–109)
CO2 SERPL-SCNC: 26 MMOL/L (ref 20–32)
CREAT SERPL-MCNC: 0.77 MG/DL (ref 0.52–1.04)
GFR SERPL CREATININE-BSD FRML MDRD: 85 ML/MIN/{1.73_M2}
GLUCOSE SERPL-MCNC: 110 MG/DL (ref 70–99)
POTASSIUM SERPL-SCNC: 4.2 MMOL/L (ref 3.4–5.3)
PROT SERPL-MCNC: 6.5 G/DL (ref 6.8–8.8)
SODIUM SERPL-SCNC: 143 MMOL/L (ref 133–144)

## 2019-09-24 PROCEDURE — 80053 COMPREHEN METABOLIC PANEL: CPT | Performed by: NURSE PRACTITIONER

## 2019-09-24 PROCEDURE — 36415 COLL VENOUS BLD VENIPUNCTURE: CPT | Performed by: NURSE PRACTITIONER

## 2019-09-24 PROCEDURE — 87389 HIV-1 AG W/HIV-1&-2 AB AG IA: CPT | Performed by: NURSE PRACTITIONER

## 2019-09-25 LAB — HIV 1+2 AB+HIV1 P24 AG SERPL QL IA: NONREACTIVE

## 2021-01-09 ENCOUNTER — HEALTH MAINTENANCE LETTER (OUTPATIENT)
Age: 61
End: 2021-01-09

## 2021-08-28 ENCOUNTER — HEALTH MAINTENANCE LETTER (OUTPATIENT)
Age: 61
End: 2021-08-28

## 2021-10-23 ENCOUNTER — HEALTH MAINTENANCE LETTER (OUTPATIENT)
Age: 61
End: 2021-10-23

## 2022-02-12 ENCOUNTER — HEALTH MAINTENANCE LETTER (OUTPATIENT)
Age: 62
End: 2022-02-12

## 2022-10-09 ENCOUNTER — HEALTH MAINTENANCE LETTER (OUTPATIENT)
Age: 62
End: 2022-10-09

## 2023-02-18 ENCOUNTER — HEALTH MAINTENANCE LETTER (OUTPATIENT)
Age: 63
End: 2023-02-18

## 2023-10-28 ENCOUNTER — HEALTH MAINTENANCE LETTER (OUTPATIENT)
Age: 63
End: 2023-10-28

## 2024-03-16 ENCOUNTER — HEALTH MAINTENANCE LETTER (OUTPATIENT)
Age: 64
End: 2024-03-16